# Patient Record
Sex: FEMALE | Race: WHITE | NOT HISPANIC OR LATINO | URBAN - NONMETROPOLITAN AREA
[De-identification: names, ages, dates, MRNs, and addresses within clinical notes are randomized per-mention and may not be internally consistent; named-entity substitution may affect disease eponyms.]

---

## 2020-06-25 ENCOUNTER — APPOINTMENT (RX ONLY)
Dept: URBAN - NONMETROPOLITAN AREA CLINIC 27 | Facility: CLINIC | Age: 50
Setting detail: DERMATOLOGY
End: 2020-06-25

## 2020-06-25 VITALS — TEMPERATURE: 98.2 F

## 2020-06-25 DIAGNOSIS — L82.1 OTHER SEBORRHEIC KERATOSIS: ICD-10-CM

## 2020-06-25 DIAGNOSIS — Z00.8 ENCOUNTER FOR OTHER GENERAL EXAMINATION: ICD-10-CM

## 2020-06-25 DIAGNOSIS — L28.0 LICHEN SIMPLEX CHRONICUS: ICD-10-CM

## 2020-06-25 DIAGNOSIS — Z71.89 OTHER SPECIFIED COUNSELING: ICD-10-CM

## 2020-06-25 PROBLEM — D48.5 NEOPLASM OF UNCERTAIN BEHAVIOR OF SKIN: Status: ACTIVE | Noted: 2020-06-25

## 2020-06-25 PROCEDURE — 99203 OFFICE O/P NEW LOW 30 MIN: CPT | Mod: 25

## 2020-06-25 PROCEDURE — ? PRESCRIPTION

## 2020-06-25 PROCEDURE — ? COUNSELING

## 2020-06-25 PROCEDURE — 11102 TANGNTL BX SKIN SINGLE LES: CPT

## 2020-06-25 PROCEDURE — ? TREATMENT REGIMEN

## 2020-06-25 PROCEDURE — ? OTHER

## 2020-06-25 PROCEDURE — ? BIOPSY BY SHAVE METHOD

## 2020-06-25 RX ORDER — TRIAMCINOLONE ACETONIDE 1 MG/G
CREAM TOPICAL
Qty: 1 | Refills: 0 | Status: ERX | COMMUNITY
Start: 2020-06-25

## 2020-06-25 RX ADMIN — TRIAMCINOLONE ACETONIDE: 1 CREAM TOPICAL at 00:00

## 2020-06-25 ASSESSMENT — LOCATION ZONE DERM
LOCATION ZONE: NECK
LOCATION ZONE: TRUNK
LOCATION ZONE: LEG

## 2020-06-25 ASSESSMENT — LOCATION DETAILED DESCRIPTION DERM
LOCATION DETAILED: SUPERIOR THORACIC SPINE
LOCATION DETAILED: LEFT MEDIAL SUPERIOR CHEST
LOCATION DETAILED: LEFT PROXIMAL CALF
LOCATION DETAILED: LEFT SUPERIOR UPPER BACK
LOCATION DETAILED: RIGHT PROXIMAL CALF
LOCATION DETAILED: RIGHT MEDIAL TRAPEZIAL NECK

## 2020-06-25 ASSESSMENT — LOCATION SIMPLE DESCRIPTION DERM
LOCATION SIMPLE: UPPER BACK
LOCATION SIMPLE: POSTERIOR NECK
LOCATION SIMPLE: RIGHT CALF
LOCATION SIMPLE: LEFT CALF
LOCATION SIMPLE: CHEST
LOCATION SIMPLE: LEFT UPPER BACK

## 2020-06-25 NOTE — PROCEDURE: TREATMENT REGIMEN
Detail Level: Detailed
Initiate Treatment: Triamcinolone wet legs, pat dry, apply thin layer, apply Saran Wrap for 2 hours for 2 weeks

## 2020-06-25 NOTE — PROCEDURE: BIOPSY BY SHAVE METHOD
Detail Level: Detailed
Depth Of Biopsy: dermis
Was A Bandage Applied: Yes
Size Of Lesion In Cm: 2.5
X Size Of Lesion In Cm: 3
Biopsy Type: H and E
Biopsy Method: Personna blade
Anesthesia Type: 1% lidocaine with epinephrine
Anesthesia Volume In Cc (Will Not Render If 0): 0.5
Additional Anesthesia Volume In Cc (Will Not Render If 0): 0
Hemostasis: Electrocautery
Wound Care: Bacitracin
Dressing: bandage
Destruction After The Procedure: No
Type Of Destruction Used: Curettage
Cryotherapy Text: The wound bed was treated with cryotherapy after the biopsy was performed.
Electrodesiccation Text: The wound bed was treated with electrodesiccation after the biopsy was performed.
Electrodesiccation And Curettage Text: The wound bed was treated with electrodesiccation and curettage after the biopsy was performed.
Silver Nitrate Text: The wound bed was treated with silver nitrate after the biopsy was performed.
Lab: [id number]
Lab Facility: [id number]
Consent was obtained and risks were reviewed including but not limited to scarring, infection, bleeding, scabbing, incomplete removal, nerve damage and allergy to anesthesia.
Post-Care Instructions: I reviewed with the patient in detail post-care instructions. Patient is to keep the biopsy site dry overnight, and then apply bacitracin twice daily until healed. Patient may apply hydrogen peroxide soaks to remove any crusting.
Notification Instructions: Patient will be notified of biopsy results. However, patient instructed to call the office if not contacted within 2 weeks.
Billing Type: Patient Bill
Information: Selecting Yes will display possible errors in your note based on the variables you have selected. This validation is only offered as a suggestion for you. PLEASE NOTE THAT THE VALIDATION TEXT WILL BE REMOVED WHEN YOU FINALIZE YOUR NOTE. IF YOU WANT TO FAX A PRELIMINARY NOTE YOU WILL NEED TO TOGGLE THIS TO 'NO' IF YOU DO NOT WANT IT IN YOUR FAXED NOTE.

## 2023-08-22 ENCOUNTER — APPOINTMENT (RX ONLY)
Dept: URBAN - NONMETROPOLITAN AREA CLINIC 25 | Facility: CLINIC | Age: 53
Setting detail: DERMATOLOGY
End: 2023-08-22

## 2023-08-22 DIAGNOSIS — L57.8 OTHER SKIN CHANGES DUE TO CHRONIC EXPOSURE TO NONIONIZING RADIATION: ICD-10-CM

## 2023-08-22 DIAGNOSIS — L91.8 OTHER HYPERTROPHIC DISORDERS OF THE SKIN: ICD-10-CM

## 2023-08-22 DIAGNOSIS — L82.1 OTHER SEBORRHEIC KERATOSIS: ICD-10-CM

## 2023-08-22 PROCEDURE — ? COUNSELING

## 2023-08-22 PROCEDURE — 99203 OFFICE O/P NEW LOW 30 MIN: CPT

## 2023-08-22 PROCEDURE — ? TREATMENT REGIMEN

## 2023-08-22 PROCEDURE — ? SUNSCREEN RECOMMENDATIONS

## 2023-08-22 ASSESSMENT — LOCATION DETAILED DESCRIPTION DERM
LOCATION DETAILED: RIGHT ANTERIOR PROXIMAL THIGH
LOCATION DETAILED: LEFT INFERIOR POSTERIOR NECK
LOCATION DETAILED: MID TRAPEZIAL NECK
LOCATION DETAILED: RIGHT LATERAL KNEE
LOCATION DETAILED: RIGHT POSTERIOR SHOULDER
LOCATION DETAILED: LEFT INFERIOR LATERAL NECK
LOCATION DETAILED: RIGHT RIB CAGE
LOCATION DETAILED: RIGHT MEDIAL TRAPEZIAL NECK
LOCATION DETAILED: LEFT DISTAL POSTERIOR UPPER ARM
LOCATION DETAILED: LEFT CLAVICULAR NECK
LOCATION DETAILED: RIGHT DISTAL POSTERIOR UPPER ARM
LOCATION DETAILED: RIGHT ANTERIOR LATERAL DISTAL THIGH
LOCATION DETAILED: RIGHT DISTAL DORSAL FOREARM
LOCATION DETAILED: LEFT POPLITEAL SKIN
LOCATION DETAILED: LEFT SUPERIOR UPPER BACK
LOCATION DETAILED: LEFT INFERIOR ANTERIOR NECK
LOCATION DETAILED: RIGHT ANTERIOR SHOULDER
LOCATION DETAILED: LEFT PROXIMAL DORSAL FOREARM

## 2023-08-22 ASSESSMENT — LOCATION SIMPLE DESCRIPTION DERM
LOCATION SIMPLE: LEFT ANTERIOR NECK
LOCATION SIMPLE: LEFT FOREARM
LOCATION SIMPLE: RIGHT KNEE
LOCATION SIMPLE: RIGHT SHOULDER
LOCATION SIMPLE: LEFT POSTERIOR UPPER ARM
LOCATION SIMPLE: RIGHT POSTERIOR UPPER ARM
LOCATION SIMPLE: ABDOMEN
LOCATION SIMPLE: LEFT POPLITEAL SKIN
LOCATION SIMPLE: LEFT UPPER BACK
LOCATION SIMPLE: POSTERIOR NECK
LOCATION SIMPLE: RIGHT THIGH
LOCATION SIMPLE: RIGHT FOREARM
LOCATION SIMPLE: TRAPEZIAL NECK

## 2023-08-22 ASSESSMENT — LOCATION ZONE DERM
LOCATION ZONE: LEG
LOCATION ZONE: ARM
LOCATION ZONE: NECK
LOCATION ZONE: TRUNK

## 2023-08-22 NOTE — PROCEDURE: TREATMENT REGIMEN
Plan: Treated in office today with LN2.
Detail Level: Detailed
Plan: Treated in office today with electrodessication.

## 2025-08-20 ENCOUNTER — APPOINTMENT (OUTPATIENT)
Dept: URBAN - NONMETROPOLITAN AREA CLINIC 25 | Facility: CLINIC | Age: 55
Setting detail: DERMATOLOGY
End: 2025-08-20

## 2025-08-20 VITALS — HEIGHT: 63 IN | WEIGHT: 135 LBS

## 2025-08-20 DIAGNOSIS — L57.8 OTHER SKIN CHANGES DUE TO CHRONIC EXPOSURE TO NONIONIZING RADIATION: ICD-10-CM | Status: STABLE

## 2025-08-20 DIAGNOSIS — L91.8 OTHER HYPERTROPHIC DISORDERS OF THE SKIN: ICD-10-CM

## 2025-08-20 DIAGNOSIS — L82.1 OTHER SEBORRHEIC KERATOSIS: ICD-10-CM | Status: STABLE

## 2025-08-20 PROCEDURE — ? TREATMENT REGIMEN

## 2025-08-20 PROCEDURE — ? COUNSELING

## 2025-08-20 PROCEDURE — ? ADDITIONAL NOTES

## 2025-08-20 PROCEDURE — ? PHOTO-DOCUMENTATION

## 2025-08-20 PROCEDURE — ? SUNSCREEN RECOMMENDATIONS

## 2025-08-20 ASSESSMENT — LOCATION DETAILED DESCRIPTION DERM
LOCATION DETAILED: LEFT POSTERIOR SHOULDER
LOCATION DETAILED: RIGHT MEDIAL BREAST 4-5:00 REGION
LOCATION DETAILED: LEFT MEDIAL BREAST 7-8:00 REGION
LOCATION DETAILED: RIGHT LATERAL BREAST 7-8:00 REGION
LOCATION DETAILED: RIGHT POSTERIOR SHOULDER
LOCATION DETAILED: LEFT LATERAL SUPERIOR EYELID
LOCATION DETAILED: LEFT SUPERIOR UPPER BACK
LOCATION DETAILED: RIGHT SUPERIOR UPPER BACK
LOCATION DETAILED: LEFT INFERIOR LATERAL MIDBACK
LOCATION DETAILED: LEFT MEDIAL SUPERIOR EYELID
LOCATION DETAILED: RIGHT INFERIOR UPPER BACK
LOCATION DETAILED: RIGHT LATERAL SUPERIOR EYELID
LOCATION DETAILED: LEFT SUPERIOR LATERAL MIDBACK
LOCATION DETAILED: LEFT SUPERIOR LATERAL UPPER BACK
LOCATION DETAILED: RIGHT INFERIOR MEDIAL MIDBACK
LOCATION DETAILED: LEFT INFRAMAMMARY CREASE (OUTER QUADRANT)
LOCATION DETAILED: RIGHT MEDIAL SUPERIOR EYELID
LOCATION DETAILED: LEFT INFRAMAMMARY CREASE (INNER QUADRANT)
LOCATION DETAILED: RIGHT MEDIAL BREAST 5-6:00 REGION

## 2025-08-20 ASSESSMENT — LOCATION SIMPLE DESCRIPTION DERM
LOCATION SIMPLE: RIGHT LOWER BACK
LOCATION SIMPLE: LEFT SHOULDER
LOCATION SIMPLE: RIGHT BREAST
LOCATION SIMPLE: LEFT SUPERIOR EYELID
LOCATION SIMPLE: LEFT LOWER BACK
LOCATION SIMPLE: RIGHT SHOULDER
LOCATION SIMPLE: LEFT BREAST
LOCATION SIMPLE: RIGHT SUPERIOR EYELID
LOCATION SIMPLE: RIGHT UPPER BACK
LOCATION SIMPLE: LEFT UPPER BACK

## 2025-08-20 ASSESSMENT — LOCATION ZONE DERM
LOCATION ZONE: TRUNK
LOCATION ZONE: ARM
LOCATION ZONE: EYELID

## 2025-08-29 ENCOUNTER — APPOINTMENT (OUTPATIENT)
Dept: URBAN - NONMETROPOLITAN AREA CLINIC 25 | Facility: CLINIC | Age: 55
Setting detail: DERMATOLOGY
End: 2025-08-29

## 2025-08-29 DIAGNOSIS — L57.8 OTHER SKIN CHANGES DUE TO CHRONIC EXPOSURE TO NONIONIZING RADIATION: ICD-10-CM

## 2025-08-29 DIAGNOSIS — L91.8 OTHER HYPERTROPHIC DISORDERS OF THE SKIN: ICD-10-CM | Status: RESOLVED

## 2025-08-29 PROCEDURE — ? SUNSCREEN RECOMMENDATIONS

## 2025-08-29 PROCEDURE — ? COUNSELING

## 2025-08-29 PROCEDURE — ? PHOTO-DOCUMENTATION

## 2025-08-29 PROCEDURE — ? SKIN TAG REMOVAL

## 2025-08-29 PROCEDURE — ? ADDITIONAL NOTES

## 2025-08-29 ASSESSMENT — LOCATION SIMPLE DESCRIPTION DERM
LOCATION SIMPLE: RIGHT INFERIOR EYELID
LOCATION SIMPLE: LEFT SUPERIOR EYELID
LOCATION SIMPLE: LEFT SHOULDER
LOCATION SIMPLE: RIGHT EYEBROW
LOCATION SIMPLE: RIGHT SHOULDER
LOCATION SIMPLE: RIGHT SUPERIOR EYELID

## 2025-08-29 ASSESSMENT — LOCATION DETAILED DESCRIPTION DERM
LOCATION DETAILED: RIGHT POSTERIOR SHOULDER
LOCATION DETAILED: LEFT LATERAL SUPERIOR EYELID
LOCATION DETAILED: RIGHT LATERAL INFERIOR EYELID
LOCATION DETAILED: RIGHT CENTRAL EYEBROW
LOCATION DETAILED: RIGHT MEDIAL SUPERIOR EYELID
LOCATION DETAILED: LEFT POSTERIOR SHOULDER
LOCATION DETAILED: LEFT MEDIAL SUPERIOR EYELID
LOCATION DETAILED: RIGHT LATERAL SUPERIOR EYELID

## 2025-08-29 ASSESSMENT — LOCATION ZONE DERM
LOCATION ZONE: EYELID
LOCATION ZONE: ARM
LOCATION ZONE: FACE